# Patient Record
(demographics unavailable — no encounter records)

---

## 2024-12-02 NOTE — HISTORY OF PRESENT ILLNESS
[FreeTextEntry1] : CPE [de-identified] : The patient is a 25-year-old female with a history of depression, currently managed with Wellbutrin  mg, prescribed by her PCP in [place]. She has been on this medication for 2 years and sees a therapist regularly. She previously tried Lexapro and Zoloft but did not tolerate them well. No other concerns.

## 2024-12-02 NOTE — HEALTH RISK ASSESSMENT
[Yes] : Yes [2 - 4 times a month (2 pts)] : 2-4 times a month (2 points) [3 or 4 (1 pt)] : 3 or 4  (1 point) [Monthly (2 pts)] : Monthly (2 points) [0] : 2) Feeling down, depressed, or hopeless: Not at all (0) [PHQ-2 Negative - No further assessment needed] : PHQ-2 Negative - No further assessment needed [Patient reported PAP Smear was normal] : Patient reported PAP Smear was normal [HIV Test offered] : HIV Test offered [Hepatitis C test offered] : Hepatitis C test offered [Single] : single [Sexually Active] : sexually active [Never] : Never [Audit-CScore] : 5 [de-identified] : walks a lot, does yoga [de-identified] : pretty healthy, tries to stay away from processed food [[id number]] : 0 [High Risk Behavior] : no high risk behavior [PapSmearDate] : spring 2024 [PapSmearComments] : never had abnormal [HIVComments] : tested negative in the past [HepatitisCComments] : tested negative in the past [FreeTextEntry2] :

## 2024-12-02 NOTE — PHYSICAL EXAM
[Normal Sclera/Conjunctiva] : normal sclera/conjunctiva [EOMI] : extraocular movements intact [No Edema] : there was no peripheral edema [Normal] : no posterior cervical lymphadenopathy and no anterior cervical lymphadenopathy [Coordination Grossly Intact] : coordination grossly intact [No Focal Deficits] : no focal deficits [Normal Gait] : normal gait [Normal Affect] : the affect was normal [Normal Insight/Judgement] : insight and judgment were intact

## 2025-02-03 NOTE — HISTORY OF PRESENT ILLNESS
[FreeTextEntry1] : abd discomfort f/u [de-identified] : 24 y/o presents w/ intermittent constipation and diarrhea. She notes she might have some element of lactose intolerance. She admits to intermittent gas pains. Greasy foods significantly worsen her symptoms. No fevers, chills. Symptoms have been going on for >6 months.

## 2025-02-03 NOTE — REVIEW OF SYSTEMS
[Abdominal Pain] : abdominal pain [Constipation] : constipation [Diarrhea] : diarrhea [Negative] : Heme/Lymph [Nausea] : no nausea [Vomiting] : no vomiting [Heartburn] : no heartburn [Melena] : no melena [FreeTextEntry7] : intermittent symptoms

## 2025-02-03 NOTE — PHYSICAL EXAM
[Normal] : no acute distress, well nourished, well developed and well-appearing [Normal Sclera/Conjunctiva] : normal sclera/conjunctiva [EOMI] : extraocular movements intact [Soft] : abdomen soft [Non Tender] : non-tender [Non-distended] : non-distended [No Masses] : no abdominal mass palpated [No HSM] : no HSM [Normal Posterior Cervical Nodes] : no posterior cervical lymphadenopathy [Normal Anterior Cervical Nodes] : no anterior cervical lymphadenopathy [Normal Affect] : the affect was normal [Normal Insight/Judgement] : insight and judgment were intact

## 2025-02-03 NOTE — HISTORY OF PRESENT ILLNESS
[FreeTextEntry1] : abd discomfort f/u [de-identified] : 24 y/o presents w/ intermittent constipation and diarrhea. She notes she might have some element of lactose intolerance. She admits to intermittent gas pains. Greasy foods significantly worsen her symptoms. No fevers, chills. Symptoms have been going on for >6 months.